# Patient Record
Sex: MALE | Race: WHITE | NOT HISPANIC OR LATINO | Employment: FULL TIME | URBAN - METROPOLITAN AREA
[De-identification: names, ages, dates, MRNs, and addresses within clinical notes are randomized per-mention and may not be internally consistent; named-entity substitution may affect disease eponyms.]

---

## 2020-08-05 ENCOUNTER — APPOINTMENT (OUTPATIENT)
Dept: RADIOLOGY | Facility: MEDICAL CENTER | Age: 30
End: 2020-08-05
Attending: EMERGENCY MEDICINE
Payer: COMMERCIAL

## 2020-08-05 ENCOUNTER — HOSPITAL ENCOUNTER (EMERGENCY)
Facility: MEDICAL CENTER | Age: 30
End: 2020-08-05
Attending: EMERGENCY MEDICINE
Payer: COMMERCIAL

## 2020-08-05 VITALS
SYSTOLIC BLOOD PRESSURE: 127 MMHG | BODY MASS INDEX: 19.17 KG/M2 | HEART RATE: 72 BPM | TEMPERATURE: 98.1 F | OXYGEN SATURATION: 100 % | HEIGHT: 72 IN | RESPIRATION RATE: 16 BRPM | WEIGHT: 141.54 LBS | DIASTOLIC BLOOD PRESSURE: 66 MMHG

## 2020-08-05 DIAGNOSIS — S60.221A CONTUSION OF RIGHT HAND, INITIAL ENCOUNTER: ICD-10-CM

## 2020-08-05 PROCEDURE — 73130 X-RAY EXAM OF HAND: CPT | Mod: RT

## 2020-08-05 PROCEDURE — 99283 EMERGENCY DEPT VISIT LOW MDM: CPT | Mod: EDC

## 2020-08-05 SDOH — HEALTH STABILITY: MENTAL HEALTH: HOW OFTEN DO YOU HAVE A DRINK CONTAINING ALCOHOL?: NEVER

## 2020-08-05 NOTE — DISCHARGE INSTRUCTIONS
At this point you do not have evidence of a fracture on your x-ray yet you may have an occult fracture that was not seen. For this reason, followup with your primary care physician or orthopedist on call within one week for reevaluation if you continue to have significant pain or followup sooner for increasing symptoms. Place ice on your painful extremity 10 minutes at a time, 10 times a day for pain. Rest, elevate and use compression as needed.      Bone Bruise, Osteochondral Lesions,   Occult Trabecular Microfracture   A bone bruise is a small hidden fracture of the bone. It typically occurs with bones located close to the surface of the skin.   DIAGNOSIS  It can only be seen on special X-rays known as MRI's. This stands for Magnetic Resonance Imaging. A regular X-ray taken of a bone bruise would appear to be normal. A bone bruise is a common injury in the knee and the heel bone (calcaneus). The problems are similar to those produced by stress fractures, which are bone injuries caused by overuse. A bone bruise may also be a sign of other injuries. For example, bone bruises are commonly found where an anterior cruciate ligament (ACL) in the knee has been pulled away from the bone (ruptured). A ligament is a tough fibrous material that connects bones together to make our joints stable. Bruises of the bone last a lot longer than bruises of the muscle or tissues beneath the skin. Bone bruises can last from days to months and are often more severe and painful than other bruises.  SYMPTOMS  The pain lasts longer than a normal bruise.   The bruised area is difficult to use.   There may be discoloration and/or swelling of the bruised area.   When a bone bruise is found with injury to the anterior cruciate ligament (in the knee) there is often an increased:   Amount of fluid in the knee   Time the fluid in the knee lasts.   Number of days until you are walking normally and regaining the motion you had before the injury.    Number of days with pain from the injury.   TREATMENTS  Because bone bruises are sudden injuries you cannot often prevent them, other than by being extremely careful. Some things you can do to improve the condition are:  Apply ice to the sore area for 15 to 20 minutes 4 times per day while awake for the first 2 days. Put the ice in a plastic bag, and place a towel between the bag of ice and your skin.   Keep your bruised area raised (elevated) when possible to lessen swelling.   For Activity:   Use crutches when necessary; do not put weight on the injured leg until you are no longer tender.   You may walk on your affected part as the pain allows, or as instructed.   Start weight bearing gradually on the bruised part.   Continue to use crutches or a cane until you can stand without causing pain, or as instructed.   If a plaster splint was applied, wear the splint until you are seen for a follow-up examination. Rest it on nothing harder than a pillow the first 24 hours. Do not put weight on it. Do not get it wet. You may take it off to take a shower or bath.   If an air splint was applied, more air may be blown into or out of the splint as needed for comfort. You may take it off at night and to take a shower or bath.   Wiggle your toes in the splint several times per day if you are able.   You may have been given an elastic bandage to use with the plaster splint or alone. The splint is too tight if you have numbness, tingling or if your foot becomes cold and blue. Adjust the bandage to make it comfortable.   Only take over-the-counter or prescription medicines for pain, discomfort, or fever as directed by your caregiver.   Follow all instructions for follow-up with your caregiver. This includes any orthopedic referrals, physical therapy, and rehabilitation. Any delay in obtaining necessary care could result in a delay or failure of the bones to heal.   SEEK MEDICAL CARE IF:  You have an increase in bruising,  swelling or pain.   You notice coldness of your toes.   You do not get pain relief with medications.   SEEK IMMEDIATE MEDICAL CARE IF:  Your toes are numb or blue.   You have severe pain not controlled with medications.   If any of the problems that caused you to seek care are becoming worse.   Document Released: 01/06/2009 Document Re-Released: 01/09/2011  [employer]® Patient Information ©2011 [employer], [employer].

## 2020-08-05 NOTE — Clinical Note
Ha Mendez was seen and treated in our emergency department on 8/5/2020.  He may return to work on 08/06/2020.  Please allow patient light duty of the right hand for 5 days as he does have a contusion/injury.     If you have any questions or concerns, please don't hesitate to call.      Zaid Canchola D.O.

## 2020-08-05 NOTE — ED TRIAGE NOTES
Chief Complaint   Patient presents with   • Hand Pain     right hand , run over [email or web address] board yesterday, swelling top hand     Pt ambulated to triage ,c/o right hand pain after run over by [employer] yesterday. Pt unable to close fist due to pain, swelling top hand noted. Cms+

## 2020-08-05 NOTE — ED NOTES
Pt left ED ambulatory and in NAD. Discharge instructions discussed with pt, as well as importance of follow up care,  verbalized understanding.

## 2020-08-05 NOTE — ED PROVIDER NOTES
ED Provider Note    CHIEF COMPLAINT  Chief Complaint   Patient presents with   • Hand Pain     right hand , run over skate board yesterday, swelling top hand       HPI  Ha Julius Mendez is a 29 y.o. male who presents with complaint of right hand pain.  He states yesterday he was with his body in his body hopped on his skateboard and landed on his right hand.  Since that time he has had increasing pain to his hand.  Pain is dull in nature and increases with movement decreases with rest.  Is primarily on the medial aspect of the dorsum of his hand.  Denies loss of sensation or strength of his hand, he is right-hand dominant.  REVIEW OF SYSTEMS  Pertinent positives include pain to the medial aspect of the right hand swelling to the medial aspect the right hand  Pertinent negatives include loss of sensation or strength    PAST MEDICAL HISTORY  History reviewed. No pertinent past medical history.    FAMILY HISTORY  History reviewed. No pertinent family history.    SOCIAL HISTORY  Social History     Socioeconomic History   • Marital status: Single     Spouse name: Not on file   • Number of children: Not on file   • Years of education: Not on file   • Highest education level: Not on file   Occupational History   • Not on file   Social Needs   • Financial resource strain: Not on file   • Food insecurity     Worry: Not on file     Inability: Not on file   • Transportation needs     Medical: Not on file     Non-medical: Not on file   Tobacco Use   • Smoking status: Never Smoker   • Smokeless tobacco: Never Used   Substance and Sexual Activity   • Alcohol use: Never     Frequency: Never   • Drug use: Not on file   • Sexual activity: Not on file   Lifestyle   • Physical activity     Days per week: Not on file     Minutes per session: Not on file   • Stress: Not on file   Relationships   • Social connections     Talks on phone: Not on file     Gets together: Not on file     Attends Jainism service: Not on file      Active member of club or organization: Not on file     Attends meetings of clubs or organizations: Not on file     Relationship status: Not on file   • Intimate partner violence     Fear of current or ex partner: Not on file     Emotionally abused: Not on file     Physically abused: Not on file     Forced sexual activity: Not on file   Other Topics Concern   • Not on file   Social History Narrative   • Not on file       SURGICAL HISTORY  History reviewed. No pertinent surgical history.    CURRENT MEDICATIONS  Home Medications     Reviewed by Bess Ambrosio R.N. (Registered Nurse) on 08/05/20 at 0759  Med List Status: Complete   Medication Last Dose Status        Patient Flo Taking any Medications                       ALLERGIES  Allergies   Allergen Reactions   • Iodine      Topical, swelling,red       PHYSICAL EXAM  VITAL SIGNS: /68   Pulse 78   Temp 36.3 °C (97.3 °F) (Temporal)   Resp 12   Ht 1.829 m (6')   Wt 64.2 kg (141 lb 8.6 oz)   SpO2 96%   BMI 19.20 kg/m²      Constitutional: Well developed, Well nourished, No acute distress, Non-toxic appearance. .   Eyes: PERRLA, EOMI, Conjunctiva normal, No discharge.   Skin: Warm, abrasion to the dorsal right hand  Extremities: Significant tenderness and edema to the dorsum of the right hand along the medial aspect, no step-off deformity, cap refill less than 2 seconds, no scaphoid tenderness, no wrist tenderness  Neurologic: Ulnar, median and radial nerve intact sensation strength right upper extremity      RADIOLOGY/PROCEDURES  DX-HAND 3+ RIGHT   Final Result      No radiographic evidence of acute traumatic injury.            COURSE & MEDICAL DECISION MAKING  Pertinent Labs & Imaging studies reviewed. (See chart for details)  This is a pleasant 21-year-old male presents with right hand contusion.  The patient has no active fracture on x-ray.  Has no neurological vascular deficits.  The patient be following up with a primary care physician as he is  asking for x-rays for previous injuries approximately 2 years ago.  I referred him for outpatient evaluation has no acute emergency condition currently besides a hand injury.  The patient is instructed return to the emergency department for increasing pain as I cannot complete exclude an occult fracture.  Although at this point the patient does not have a fracture on x-ray there is a possibility the patient has sustained an occult fracture. For this reason, the patient is to followup with their primary care physician or orthopedist on call within one week if they continue to have pain or return sooner for increasing pain.      FINAL IMPRESSION     1. Contusion of right hand, initial encounter        DISPOSITION:  Patient will be discharged home in stable condition.    FOLLOW UP:  [facility], Emergency Dept  [address] [facility] [id number] [phone]    If symptoms worsen    [address] [address]  [phone]  Schedule an appointment as soon as possible for a visit in 1 week  For your continued medical care.    Davey Rodgers M.D.  [address] [facility] [address]  [phone]    Schedule an appointment as soon as possible for a visit in 1 week          Electronically signed by: Zaid Canchola D.O., 8/5/2020 8:09 AM

## 2022-03-22 ENCOUNTER — HOSPITAL ENCOUNTER (EMERGENCY)
Facility: MEDICAL CENTER | Age: 32
End: 2022-03-22
Attending: EMERGENCY MEDICINE
Payer: COMMERCIAL

## 2022-03-22 VITALS
WEIGHT: 147.93 LBS | BODY MASS INDEX: 20.71 KG/M2 | OXYGEN SATURATION: 97 % | HEIGHT: 71 IN | DIASTOLIC BLOOD PRESSURE: 73 MMHG | TEMPERATURE: 99.1 F | RESPIRATION RATE: 16 BRPM | SYSTOLIC BLOOD PRESSURE: 118 MMHG | HEART RATE: 64 BPM

## 2022-03-22 DIAGNOSIS — H61.23 BILATERAL IMPACTED CERUMEN: ICD-10-CM

## 2022-03-22 PROCEDURE — 700102 HCHG RX REV CODE 250 W/ 637 OVERRIDE(OP): Performed by: EMERGENCY MEDICINE

## 2022-03-22 PROCEDURE — A9270 NON-COVERED ITEM OR SERVICE: HCPCS | Performed by: EMERGENCY MEDICINE

## 2022-03-22 PROCEDURE — 69209 REMOVE IMPACTED EAR WAX UNI: CPT

## 2022-03-22 PROCEDURE — 99282 EMERGENCY DEPT VISIT SF MDM: CPT

## 2022-03-22 RX ADMIN — CARBAMIDE PEROXIDE 6.5% 6 DROP: 6.5 LIQUID AURICULAR (OTIC) at 22:00

## 2022-03-22 NOTE — Clinical Note
Ha Mendez was seen and treated in our emergency department on 3/22/2022.  He may return to work on 03/27/2022.       If you have any questions or concerns, please don't hesitate to call.      Nelson Garg M.D.

## 2022-03-23 NOTE — ED PROVIDER NOTES
"ED Provider Note    CHIEF COMPLAINT  Chief Complaint   Patient presents with   • Other     Pt reports his right ear being \"clogged\" and muffled hearing x18 hours. Denies pain or fevers at home. Reports he has had same issue in the past. Tried multiple home remedies and \"Dissolve it\" ear medication at home with no improvement.        HPI  Ha Mendez is a 31 y.o. male who presents for evaluation of sensation of fullness to both ears.  He feels that his right ear is worse and he reports decreased hearing and a muffled sensation.  He has attempted to clean his ears with over-the-counter drops for the past few days but he has had no improvement.    REVIEW OF SYSTEMS  Constitutional: No fevers or chills  HEENT: No sore throat, runny nose, sores, trouble swallowing, trouble speaking.  Neck: No neck pain, stiffness, or masses.    PAST FAM HISTORY  History reviewed. No pertinent family history.    PAST MEDICAL HISTORY   has a past medical history of Gastritis.    SOCIAL HISTORY  Social History     Tobacco Use   • Smoking status: Never Smoker   • Smokeless tobacco: Never Used   Vaping Use   • Vaping Use: Never used   Substance and Sexual Activity   • Alcohol use: Yes     Comment: Occasionally    • Drug use: Yes     Types: Inhaled     Comment: Marijuana   • Sexual activity: Not on file       SURGICAL HISTORY  patient denies any surgical history    CURRENT MEDICATIONS  Home Medications     Reviewed by April Reyes R.N. (Registered Nurse) on 03/22/22 at 2036  Med List Status: <None>   Medication Last Dose Status        Patient Flo Taking any Medications                       ALLERGIES  Allergies   Allergen Reactions   • Iodine      Topical, swelling,red       PHYSICAL EXAM  VITAL SIGNS: /73   Pulse 64   Temp 37.3 °C (99.1 °F)   Resp 16   Ht 1.803 m (5' 11\")   Wt 67.1 kg (147 lb 14.9 oz)   SpO2 97%   BMI 20.63 kg/m²    Gen: Alert in no apparent distress.  HEENT: No signs of trauma, Bilateral " external ears normal, Nose normal. Conjunctiva normal, Non-icteric.  Bilateral cerumen occlusion noted.  No periauricular tenderness or mastoid tenderness.  Neck:  No tenderness, Supple, No masses  Lymphatic: No cervical lymphadenopathy noted.   Cardiovascular: Regular rate and rhythm, no murmurs.    Skin: Warm, Dry      COURSE & MEDICAL DECISION MAKING  Patient arrives for evaluation of what appears to be bilateral cerumen impaction which she feels is now affecting his daily job as a .  He feels his depth perception is off but denies any feeling of vertigo, dizziness, or ringing in the ears.  He has no recent prodrome to suggest an infectious etiology.  After discussion with the patient, we will attempt Debrox eardrops and irrigation with warm water to see if we can loosen the impaction.  It is possible he will need referral to ENT.     11:15 PM  No significant results even after multiple rounds of irrigation and Debrox.  Patient states understanding he will likely need referral to an ENT for proper cleaning.  As he does not have any findings to suggest an infective process, he will be given drops that he can use every night in an attempt to loosen the cerumen impaction up in the meantime.  Likely will take several weeks to get into an ENT anyway and he may clear the impaction himself.  He states understanding if he starts developing fevers or increasing pain he needs to return for reevaluation.  He will be given the number for the on-call ENT surgeon and asked to call tomorrow to arrange an appointment.  FINAL IMPRESSION  1. Bilateral impacted cerumen        Electronically signed by: Nelson Garg M.D., 3/22/2022 9:40 PM

## 2022-03-23 NOTE — ED TRIAGE NOTES
"  Chief Complaint   Patient presents with   • Other     Pt reports his right ear being \"clogged\" and muffled hearing x18 hours. Denies pain or fevers at home. Reports he has had same issue in the past. Tried multiple home remedies and \"Dissolve it\" ear medication at home with no improvement.      Pt is alert/oriented, following commands, and answering questions appropriately. No acute distress noted in triage and respirations are even and unlabored.   Pt placed in lobby and educated on triage process. Pt encouraged to alert staff for any changes in condition.    ./71   Pulse 73   Temp 36.6 °C (97.9 °F) (Temporal)   Resp 17   Ht 1.803 m (5' 11\")   Wt 67.1 kg (147 lb 14.9 oz)   SpO2 97%   BMI 20.63 kg/m²     "

## 2022-03-23 NOTE — ED NOTES
Patient discharged in stable condition per order. Oral and written discharge instructions reviewed. Medications sent to home pharmacy. New medications reviewed. All belongings accounted for and taken with patient. Wristband cut off per protocol. Questions answered, and patient agrees with discharge plan. Patient escorted to lobby. Work note provided. Encouraged to follow up with ENT

## 2022-03-25 NOTE — DISCHARGE PLANNING
note:    RN CM received incoming call from pt requesting a referral for Dr. Chairez ENT, as listed for follow up in pt's AVS. RN CM faxed referral to [phone] and provided pt with CM phone number for follow up questions. Gave handoff report to night KEV BRANDT.

## 2022-03-25 NOTE — DISCHARGE PLANNING
note:  Received a call from pt stating that he is having the most difficult trying to get an appt with Dr. Chairez. He said that office staff were rude to him.     CM called Dr. Chairez's office and talked to manager Renetta who said that they will not be able to see pt until end of April. She further stated that she does not think that this is an emergency. Explained that pt said he is in a lot of pain and has developed dizziness and unable to work. Renetta said that they can place on a cancellation list but also Dr. Chairez is off but Dr. Monae is working to cover. Renetta was not willing to give pt an appt because she does not think this is an emergency.     Pt requested for assistance with [facility]. CM called [facility]  and they will talk to their MD. RIKKI has also faxed clinical notes to [phone].    Attempted to call pt at his jermaine number but no answer and mailbox is full.

## 2022-08-30 ENCOUNTER — APPOINTMENT (RX ONLY)
Dept: URBAN - METROPOLITAN AREA CLINIC 4 | Facility: CLINIC | Age: 32
Setting detail: DERMATOLOGY
End: 2022-08-30

## 2022-08-30 DIAGNOSIS — A63.0 ANOGENITAL (VENEREAL) WARTS: ICD-10-CM

## 2022-08-30 PROCEDURE — ? COUNSELING

## 2022-08-30 PROCEDURE — ? LIQUID NITROGEN GENITALS

## 2022-08-30 PROCEDURE — 54056 CRYOSURGERY PENIS LESION(S): CPT

## 2022-08-30 PROCEDURE — ? PRESCRIPTION

## 2022-08-30 ASSESSMENT — LOCATION DETAILED DESCRIPTION DERM: LOCATION DETAILED: VENTRAL PENILE SHAFT

## 2022-08-30 ASSESSMENT — LOCATION SIMPLE DESCRIPTION DERM: LOCATION SIMPLE: PENIS

## 2022-08-30 ASSESSMENT — LOCATION ZONE DERM: LOCATION ZONE: PENIS

## 2022-08-31 RX ADMIN — PODOFILOX: 5 SOLUTION TOPICAL at 00:00

## 2022-09-01 RX ORDER — PODOFILOX 5 MG/ML
SOLUTION TOPICAL
Qty: 3.5 | Refills: 1 | Status: ERX | COMMUNITY
Start: 2022-08-31

## 2022-10-27 ENCOUNTER — APPOINTMENT (RX ONLY)
Dept: URBAN - METROPOLITAN AREA CLINIC 4 | Facility: CLINIC | Age: 32
Setting detail: DERMATOLOGY
End: 2022-10-27

## 2022-10-27 DIAGNOSIS — A63.0 ANOGENITAL (VENEREAL) WARTS: ICD-10-CM

## 2022-10-27 PROCEDURE — ? LIQUID NITROGEN GENITALS

## 2022-10-27 PROCEDURE — 54056 CRYOSURGERY PENIS LESION(S): CPT

## 2022-10-27 PROCEDURE — ? PRESCRIPTION

## 2022-10-27 PROCEDURE — ? COUNSELING

## 2022-10-27 RX ORDER — PODOFILOX 5 MG/ML
SOLUTION TOPICAL
Qty: 3.5 | Refills: 1 | Status: CANCELLED
Stop reason: SDUPTHER

## 2022-10-27 ASSESSMENT — LOCATION DETAILED DESCRIPTION DERM: LOCATION DETAILED: VENTRAL PENILE SHAFT

## 2022-10-27 ASSESSMENT — LOCATION SIMPLE DESCRIPTION DERM: LOCATION SIMPLE: PENIS

## 2022-10-27 ASSESSMENT — LOCATION ZONE DERM: LOCATION ZONE: PENIS
